# Patient Record
Sex: FEMALE | Race: OTHER | HISPANIC OR LATINO
[De-identification: names, ages, dates, MRNs, and addresses within clinical notes are randomized per-mention and may not be internally consistent; named-entity substitution may affect disease eponyms.]

---

## 2020-01-22 PROBLEM — Z00.00 ENCOUNTER FOR PREVENTIVE HEALTH EXAMINATION: Status: ACTIVE | Noted: 2020-01-22

## 2020-01-30 ENCOUNTER — APPOINTMENT (OUTPATIENT)
Dept: SURGERY | Facility: CLINIC | Age: 75
End: 2020-01-30
Payer: MEDICARE

## 2020-01-30 VITALS
HEIGHT: 62 IN | WEIGHT: 143 LBS | DIASTOLIC BLOOD PRESSURE: 80 MMHG | TEMPERATURE: 99.1 F | SYSTOLIC BLOOD PRESSURE: 127 MMHG | BODY MASS INDEX: 26.31 KG/M2 | HEART RATE: 64 BPM

## 2020-01-30 DIAGNOSIS — N63.10 UNSPECIFIED LUMP IN THE RIGHT BREAST, UNSPECIFIED QUADRANT: ICD-10-CM

## 2020-01-30 DIAGNOSIS — Z87.39 PERSONAL HISTORY OF OTHER DISEASES OF THE MUSCULOSKELETAL SYSTEM AND CONNECTIVE TISSUE: ICD-10-CM

## 2020-01-30 DIAGNOSIS — Z78.9 OTHER SPECIFIED HEALTH STATUS: ICD-10-CM

## 2020-01-30 DIAGNOSIS — Z82.49 FAMILY HISTORY OF ISCHEMIC HEART DISEASE AND OTHER DISEASES OF THE CIRCULATORY SYSTEM: ICD-10-CM

## 2020-01-30 DIAGNOSIS — I10 ESSENTIAL (PRIMARY) HYPERTENSION: ICD-10-CM

## 2020-01-30 PROCEDURE — 99203 OFFICE O/P NEW LOW 30 MIN: CPT

## 2020-01-30 NOTE — PLAN
[FreeTextEntry1] : Ms. JAIMES  is presenting  today for an evaluation .  she is doing well and offers no complaints.  Results of  her recent  imaging and physical examination findings were discussed in details.   She  was advised to have   US guided right breast biopsy and return after the tests. Importance of monthly self-breast examination was reinforced.  Patient's questions and concerns addressed to patient's satisfaction.\par

## 2020-01-30 NOTE — CONSULT LETTER
[Dear  ___] : Dear  [unfilled], [Consult Letter:] : I had the pleasure of evaluating your patient, [unfilled]. [Sincerely,] : Sincerely, [Consult Closing:] : Thank you very much for allowing me to participate in the care of this patient.  If you have any questions, please do not hesitate to contact me. [Please see my note below.] : Please see my note below. [FreeTextEntry3] : Redd Mason MD, FACS

## 2020-01-30 NOTE — HISTORY OF PRESENT ILLNESS
[de-identified] : Patient is a 74 year  year-old female  who was referred by Dr. Josh Aguilar with the chief complaint of having a Right  breast mass.  She  denies any trauma and She has no nipple discharge. She  denies any fever, night sweats or loss of appetite.    There is no family history of breast carcinoma.   Menarche at age 12 -1 para-0 and her last menstrual period was at age 44 before her hysterectomy for fibroids.  Patient is on no hormonal replacement therapy.   Patient  had a mammogram on 2019 ,   that was deemed a BIRADS 0 .  She had a sonogram of the breast on 2019 it was deemed a BIRADS 4 secondary to right breast mass.  she reports right breast mass axcision in her 20s. \par

## 2020-01-30 NOTE — PHYSICAL EXAM
[Alert] : alert [Oriented to Place] : oriented to place [Oriented to Person] : oriented to person [Oriented to Time] : oriented to time [de-identified] : She  is alert, well-groomed, and cheerful.\par   [Calm] : calm [de-identified] :   anicteric.  Nasal mucosa pink, septum midline. Oral mucosa pink.  Tongue midline, Pharynx without exudates.\par   [de-identified] : No chest deformity. Breast are symmetric, Normal contours. No nodules, masses, tenderness, or axillary or supraclavicular  adenopathy. No nipple discharge. no skin retraction \par   [de-identified] :  Neck supple. Trachea midline. Thyroid isthmus barely palpable, lobes not felt.\par

## 2020-03-12 ENCOUNTER — APPOINTMENT (OUTPATIENT)
Dept: SURGERY | Facility: CLINIC | Age: 75
End: 2020-03-12
Payer: MEDICARE

## 2020-03-12 VITALS
WEIGHT: 160 LBS | DIASTOLIC BLOOD PRESSURE: 78 MMHG | HEART RATE: 69 BPM | HEIGHT: 62 IN | BODY MASS INDEX: 29.44 KG/M2 | SYSTOLIC BLOOD PRESSURE: 138 MMHG

## 2020-03-12 PROCEDURE — 99213 OFFICE O/P EST LOW 20 MIN: CPT

## 2020-03-12 RX ORDER — METOPROLOL TARTRATE 75 MG/1
TABLET, FILM COATED ORAL
Refills: 0 | Status: ACTIVE | COMMUNITY

## 2020-03-12 RX ORDER — OLMESARTAN MEDOXOMIL 40 MG/1
TABLET, FILM COATED ORAL
Refills: 0 | Status: ACTIVE | COMMUNITY

## 2020-03-12 RX ORDER — AMLODIPINE BESYLATE 5 MG/1
TABLET ORAL
Refills: 0 | Status: ACTIVE | COMMUNITY

## 2020-03-12 RX ORDER — CLONIDINE HYDROCHLORIDE 0.3 MG/1
TABLET ORAL
Refills: 0 | Status: ACTIVE | COMMUNITY

## 2020-03-12 RX ORDER — SPIRONOLACTONE 50 MG/1
TABLET ORAL
Refills: 0 | Status: ACTIVE | COMMUNITY

## 2020-03-12 NOTE — DATA REVIEWED
[FreeTextEntry1] : 						\par Exam requested by:\par PADMINI IBRAHIM MD\par [address] [place]\par [facility] [address]\par SITE PERFORMED: [place]\par SITE PHONE: [phone]\par Patient: KRUNAL JAIMES\par YOB: 1945\par Phone: [phone]\par MRN: [id number] Acc: [id number]\par Date of Exam: 02-\par  \par Addendum 2 - 02-\par  \par ADDENDUM\par \par Results were discussed with Dr. Ibrahim at 2:54 PM.\par \par \par \par \par Thank you for the opportunity to participate in the care of this patient.  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 Receptor analysis addendum to follow.\par \par CONCORDANCE: Pathology results match the prebiopsy imaging appearance.\par \par RECOMMENDATION: \par \par Surgical consultation.\par \par Thank you for the opportunity to participate in the care of this patient.  \par  \par Bowen Johnson MD  - Electronically Signed: 02- 2:40 PM \par Physician to Physician Direct Line is: [phone]

## 2020-03-12 NOTE — PHYSICAL EXAM
[Alert] : alert [Oriented to Person] : oriented to person [Oriented to Place] : oriented to place [Oriented to Time] : oriented to time [Calm] : calm [de-identified] : She  is alert, well-groomed, and cheerful.\par   [de-identified] :   anicteric.  Nasal mucosa pink, septum midline. Oral mucosa pink.  Tongue midline, Pharynx without exudates.\par   [de-identified] :  Neck supple. Trachea midline. Thyroid isthmus barely palpable, lobes not felt.\par   [de-identified] : No chest deformity. Breast are symmetric, Normal contours. No nodules, masses, tenderness, or axillary or supraclavicular  adenopathy. No nipple discharge. no skin retraction \par

## 2020-03-12 NOTE — PLAN
[FreeTextEntry1] : Ms. JAIMES  is presenting  today for an evaluation .  she is doing well and offers no complaints.  Results of  her recent   Breast Biopsy and physical examination findings were discussed in details. No palpable masses.    She  was advised to have BL                 breast MRI   and return after the tests. Importance of monthly self-breast examination was reinforced.  Patient's questions and concerns addressed to patient's satisfaction.\par

## 2020-03-12 NOTE — HISTORY OF PRESENT ILLNESS
[de-identified] : This is  a 74 year   old patient presenting today for a breast exam and to discuss the results of her recent  breast  biopsy Patient had a right breast  US guided biopsy on 02/25/2020 . 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 Pathology results match the pre-biopsy imaging appearance. results are concordant to the imaging. Ms. JAIMES denies any trauma and she has no nipple discharge. Patient denies any fever, night sweats or loss of appetite.    There is no family history of breast carcinoma. [de-identified] :  PAtient had Echocardiogram on 02/05/2020 - normal.

## 2020-03-23 ENCOUNTER — APPOINTMENT (OUTPATIENT)
Dept: SURGERY | Facility: CLINIC | Age: 75
End: 2020-03-23

## 2020-04-20 ENCOUNTER — APPOINTMENT (OUTPATIENT)
Dept: SURGERY | Facility: CLINIC | Age: 75
End: 2020-04-20
Payer: MEDICARE

## 2020-04-20 VITALS — DIASTOLIC BLOOD PRESSURE: 83 MMHG | HEART RATE: 63 BPM | SYSTOLIC BLOOD PRESSURE: 137 MMHG | TEMPERATURE: 98.8 F

## 2020-04-20 PROCEDURE — 99213 OFFICE O/P EST LOW 20 MIN: CPT

## 2020-04-20 NOTE — HISTORY OF PRESENT ILLNESS
[de-identified] : This is  a 74 year   old patient presenting today for a breast exam and to discuss the results of her recent  BL breast MRI  Patient had a right breast  US guided biopsy on 02/25/2020 . 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   MRI was done on 03/19/2020  and it showed The biopsy-proven malignancy in the right breast 5 o'clock position measures 1.0 x 0.8 x 0.5 cm. it has also shown a 0.6 x 0.4 x 0.4 cm irregular enhancing lesion in the RIGHT breast 11 o'clock position and a 1.8 cm segment of clumped linear enhancement in the LEFT breast 2 o'clock position are suspicious for malignancy.   patient had BL breast Biopsies on 04/01/2020.  RIGHT 11 o'clock axis showed atypical ductal hyperplasia. the  LEFT 2:00 axis yielded mildly proliferative breast changes characterized by usual ductal hyperplasia, adenosis, columnar cell change, cystic apocrine metaplasia, scattered epithelial calcifications in the background of dense stromal fibrosis with focal PASH. Ms. JAIMES denies any trauma and she has no nipple discharge. Patient denies any fever, night sweats or loss of appetite.   Patient is on no hormonal replacement therapy.  receptors are not available.  [de-identified] :   Patient reports no interval changes to her overall health status or medical history

## 2020-04-20 NOTE — PHYSICAL EXAM
[Alert] : alert [Oriented to Person] : oriented to person [Oriented to Place] : oriented to place [Calm] : calm [Oriented to Time] : oriented to time [de-identified] : She  is alert, well-groomed, and cheerful.\par   [de-identified] :   anicteric.  Nasal mucosa pink, septum midline. Oral mucosa pink.  Tongue midline, Pharynx without exudates.\par   [de-identified] :  Neck supple. Trachea midline. Thyroid isthmus barely palpable, lobes not felt.\par   [de-identified] : No chest deformity. Breast are symmetric, Normal contours. No nodules, masses, tenderness, or axillary or supraclavicular  adenopathy. No nipple discharge. no skin retraction \par

## 2020-04-20 NOTE — DATA REVIEWED
[FreeTextEntry1] : 	\par Exam requested by:\par NIRANJAN PONCE MD\par [address] [facility]\par [facility] [address]\par SITE PERFORMED: FLUSHING\par SITE PHONE: [phone]\par Patient: KRUNAL JAIMES\par YOB: 1945\par Phone: [phone]\par MRN: [id number] Acc: [id number]\par Date of Exam: 03-\par  \par EXAM:  MRI BILATERAL BREASTS WITHOUT AND WITH CONTRAST\par \par HISTORY:  The patient is 74 years old and is seen for evaluation of extent of disease. The patient was recently diagnosed with invasive ductal carcinoma and DCIS in the 5 o'clock position of the right breast 4 cm from the nipple on ultrasound-guided core needle biopsy. There is no family history of breast cancer.\par \par TECHNIQUE: Breast MR protocol - Multiplanar, multisequence MR imaging of both breasts was performed on a 3T magnet: T2 weighted sequence with fat suppression in the axial plane, T1 weighted sequences with fat suppression before and after the administration of gadolinium contrast in the axial and sagittal planes.\par \par Contrast:  16 mL Dotarem from a 20 mL vial.\par \par The patient was scanned in the prone position utilizing a dedicated breast coil.  \par \par Post processing subtraction was performed. This study was analyzed on a [email or web address] Workstation with capabilities of multiplanar reformatting, maximum intensity projection, computer aided detection and time:signal intensity kinetic curve generation.  Results were compared with the PACS workstation. \par \par COMPARISON:  Correlation is made with mammogram dated 11/14/2019, ultrasound dated 12/27/2019, and images from ultrasound-guided core needle biopsy dated 2/25/2020.\par \par FINDINGS:  \par \par BACKGROUND BREAST APPEARANCE:  There is heterogeneously dense fibroglandular tissue with mild background parenchymal enhancement.\par \par Right breast: In the 5 o'clock position middle depth on series 604 image 145/225, there is a 1.0 x 0.8 x 0.5 cm enhancing mass containing susceptibility artifact from a biopsy clip, corresponding with the biopsy-proven site of malignancy. In the 11 o'clock position of the right breast middle depth on series 604 image 99/225 and series 617 image 77/94, there is a 0.6 x 0.4 x 0.4 cm irregular enhancing lesion which is suspicious for malignancy.\par \par Left breast: In the 2 o'clock position middle depth on series 604 image 116/225 and series 617 image 16/94, there is a 1.8 cm segment of clumped linear enhancement which is suspicious for malignancy. \par \par There is no significant axillary or internal mammary lymphadenopathy.\par \par IMPRESSION: \par \par 1. A 0.6 x 0.4 x 0.4 cm irregular enhancing lesion in the right breast 11 o'clock position and a 1.8 cm segment of clumped linear enhancement in the left breast 2 o'clock position are suspicious for malignancy. MR guided core needle biopsies of these 2 sites are recommended.\par \par 2. The biopsy-proven malignancy in the right breast 5 o'clock position measures 1.0 x 0.8 x 0.5 cm. Surgical oncological management is recommended.  \par \par FOLLOW-UP:  Biopsy should be considered.\par \par ASSESSMENT:  BI-RADS Category 4:  Suspicious. \par \par Thank you for the opportunity to participate in the care of this patient.  \par  \par GARRICK JOYA MD  - Electronically Signed: 03- 9:48 AM \par Physician to Physician Direct Line is: [phone]\par

## 2020-04-20 NOTE — PLAN
[FreeTextEntry1] : Ms. JAIMES  was told significance of findings, options, risks and benefits were explained.  Pathology results were discussed in details.  Informed consent for right modified mastectomy with sentinel node biopsy and potential risks, benefits and alternatives (surgical options were discussed including non-surgical options or the option of no surgery) to the planned surgery were discussed in depth.  All surgical options were discussed including non-surgical treatments.  She wishes to proceed with surgery.  We will plan for surgery on at the next available date, pending any required insurance pre-certification or pre-approval. She agrees to obtain any necessary pre-operative evaluations and testing prior to surgery.\par Patient advised to seek immediate medical attention with any acute change in symptoms or with the development of any new or worsening symptoms.  Patient's questions and concerns addressed to patient's satisfaction, and patient verbalized an understanding of the information discussed.\par \par

## 2020-05-18 ENCOUNTER — OUTPATIENT (OUTPATIENT)
Dept: OUTPATIENT SERVICES | Facility: HOSPITAL | Age: 75
LOS: 1 days | End: 2020-05-18

## 2020-05-18 ENCOUNTER — APPOINTMENT (OUTPATIENT)
Dept: DISASTER EMERGENCY | Facility: CLINIC | Age: 75
End: 2020-05-18

## 2020-05-18 VITALS
WEIGHT: 162.92 LBS | DIASTOLIC BLOOD PRESSURE: 84 MMHG | HEIGHT: 62 IN | SYSTOLIC BLOOD PRESSURE: 123 MMHG | OXYGEN SATURATION: 100 % | RESPIRATION RATE: 16 BRPM | TEMPERATURE: 98 F | HEART RATE: 91 BPM

## 2020-05-18 DIAGNOSIS — C50.911 MALIGNANT NEOPLASM OF UNSPECIFIED SITE OF RIGHT FEMALE BREAST: ICD-10-CM

## 2020-05-18 DIAGNOSIS — Z90.710 ACQUIRED ABSENCE OF BOTH CERVIX AND UTERUS: Chronic | ICD-10-CM

## 2020-05-18 DIAGNOSIS — Z98.890 OTHER SPECIFIED POSTPROCEDURAL STATES: Chronic | ICD-10-CM

## 2020-05-18 DIAGNOSIS — I10 ESSENTIAL (PRIMARY) HYPERTENSION: ICD-10-CM

## 2020-05-18 DIAGNOSIS — Z01.818 ENCOUNTER FOR OTHER PREPROCEDURAL EXAMINATION: ICD-10-CM

## 2020-05-18 NOTE — H&P PST ADULT - NEGATIVE GENERAL GENITOURINARY SYMPTOMS
no hematuria/normal urinary frequency/no incontinence/no urinary hesitancy/no renal colic/no dysuria

## 2020-05-18 NOTE — H&P PST ADULT - NSICDXPROBLEM_GEN_ALL_CORE_FT
PROBLEM DIAGNOSES  Problem: Hypertension  Assessment and Plan: Continue antihypertensive meds and take with sips of water on day of surgery.  Cleared by PCP. Follow-up with PCP postop for management.    Problem: Malignant neoplasm of unspecified site of right female breast  Assessment and Plan: Patient  is scheduled for right modified radical mastectomy with sentinel lymph node biopsy on 5/20/20.

## 2020-05-18 NOTE — H&P PST ADULT - MUSCULOSKELETAL
details… detailed exam decreased ROM due to pain/diminished strength/no joint swelling/no joint erythema/no joint warmth

## 2020-05-18 NOTE — H&P PST ADULT - NEGATIVE FEMALE-SPECIFIC SYMPTOMS
No pertinent past medical history no abnormal vaginal bleeding/no pelvic pain/no spotting <<----- Click to add NO pertinent Past Medical History

## 2020-05-18 NOTE — H&P PST ADULT - COMMENTS
VS taken at preop PCU on day of surgery 5/20/2020, after patient arrived for surgery, due to COVID restrictions

## 2020-05-18 NOTE — H&P PST ADULT - GASTROINTESTINAL DETAILS
normal/no bruit/bowel sounds normal/soft/nontender/no distention/no masses palpable/no rebound tenderness

## 2020-05-18 NOTE — H&P PST ADULT - NS PRO PASSIVE SMOKE EXP
Clorece is here for lab draw and results. Patient denies any fevers, chills, or signs of infection. Port accessed, per [facility] procedure, with 20G Briseno. Brisk blood return noted. 10ml 0.9 NS flush before and after blood draw, followed by 500 units of heparin. Port deaccessed, pressure held, gauze dressing applied to site. Patient tolerated procedure well. Patient wishes to stay for results.      CBC results reviewed with patient at chairside. Clorece was instructed to monitor for signs of infection or illness. Clorece verbalized understanding of information given.    No

## 2020-05-18 NOTE — H&P PST ADULT - NSICDXPASTMEDICALHX_GEN_ALL_CORE_FT
PAST MEDICAL HISTORY:  Cyst, breast, right hx of    Fibroids hx of    Malignant neoplasm of unspecified site of right female breast     Osteoarthritis bilateral knees

## 2020-05-18 NOTE — H&P PST ADULT - NEGATIVE BREAST SYMPTOMS
no breast tenderness R/no breast lump L/no nipple discharge L/no breast tenderness L/no nipple discharge R

## 2020-05-18 NOTE — H&P PST ADULT - ADDITIONAL PE
Addendum by BAMBI Mueller NP 05/20/20 - PE done on day of surgery at preop PCU when patient arrived for surgery

## 2020-05-18 NOTE — H&P PST ADULT - BREASTS COMMENTS
right breast mass 11 o'clock and 5 o'clock right breast mass 11 o'clock and 5 o'clock as per mammogram , no palpable

## 2020-05-18 NOTE — H&P PST ADULT - NEGATIVE CARDIOVASCULAR SYMPTOMS
no orthopnea/no paroxysmal nocturnal dyspnea/no claudication/no dyspnea on exertion/no peripheral edema/no chest pain/no palpitations

## 2020-05-18 NOTE — H&P PST ADULT - HISTORY OF PRESENT ILLNESS
75 years old female interviewed over phone with  for evaluation before surgery , pt was diagnosed with Malignant neoplasm of unspecified site of right female breast and is scheduled for right modified radical mastectomy with sentinel lymph node biopsy on 5/20/20.

## 2020-05-18 NOTE — H&P PST ADULT - NEGATIVE NEUROLOGICAL SYMPTOMS
no paresthesias/no difficulty walking/no confusion/no weakness/no generalized seizures/no loss of sensation/no loss of consciousness/no syncope/no headache/no facial palsy/no transient paralysis/no focal seizures/no tremors/no vertigo/no hemiparesis

## 2020-05-18 NOTE — H&P PST ADULT - ASSESSMENT
75 years old female  diagnosed with Malignant neoplasm of unspecified site of right female breast. Instructions given to patient in [place]ik, pt verbalized understanding, pt to stop ASA 81 mg , last dose 5/17/2020, dr. Mason aware, COVID 19 rapid nasopharyngeal test pending 75 years old female  diagnosed with Malignant neoplasm of unspecified site of right female breast. Instructions given to patient in Turkish, pt verbalized understanding, pt to stop ASA 81 mg , last dose 5/17/2020, dr. Mason aware, COVID 19 rapid nasopharyngeal test NEGATIVE, OR booking notified

## 2020-05-19 ENCOUNTER — TRANSCRIPTION ENCOUNTER (OUTPATIENT)
Age: 75
End: 2020-05-19

## 2020-05-19 LAB — SARS-COV-2 N GENE NPH QL NAA+PROBE: NOT DETECTED

## 2020-05-20 ENCOUNTER — APPOINTMENT (OUTPATIENT)
Dept: SURGERY | Facility: HOSPITAL | Age: 75
End: 2020-05-20

## 2020-05-20 ENCOUNTER — INPATIENT (INPATIENT)
Facility: HOSPITAL | Age: 75
LOS: 0 days | Discharge: HOME | DRG: 581 | End: 2020-05-21
Attending: SPECIALIST | Admitting: SPECIALIST
Payer: COMMERCIAL

## 2020-05-20 ENCOUNTER — RESULT REVIEW (OUTPATIENT)
Age: 75
End: 2020-05-20

## 2020-05-20 VITALS
OXYGEN SATURATION: 100 % | HEIGHT: 62 IN | TEMPERATURE: 98 F | HEART RATE: 91 BPM | RESPIRATION RATE: 16 BRPM | WEIGHT: 162.92 LBS | DIASTOLIC BLOOD PRESSURE: 84 MMHG | SYSTOLIC BLOOD PRESSURE: 123 MMHG

## 2020-05-20 DIAGNOSIS — D21.9 BENIGN NEOPLASM OF CONNECTIVE AND OTHER SOFT TISSUE, UNSPECIFIED: ICD-10-CM

## 2020-05-20 DIAGNOSIS — C50.911 MALIGNANT NEOPLASM OF UNSPECIFIED SITE OF RIGHT FEMALE BREAST: ICD-10-CM

## 2020-05-20 DIAGNOSIS — Z98.890 OTHER SPECIFIED POSTPROCEDURAL STATES: Chronic | ICD-10-CM

## 2020-05-20 DIAGNOSIS — Z90.710 ACQUIRED ABSENCE OF BOTH CERVIX AND UTERUS: Chronic | ICD-10-CM

## 2020-05-20 LAB
ABO RH CONFIRMATION: SIGNIFICANT CHANGE UP
BLD GP AB SCN SERPL QL: SIGNIFICANT CHANGE UP
HCT VFR BLD CALC: 39.9 % — SIGNIFICANT CHANGE UP (ref 34.5–45)
HGB BLD-MCNC: 13.2 G/DL — SIGNIFICANT CHANGE UP (ref 11.5–15.5)
MCHC RBC-ENTMCNC: 30.1 PG — SIGNIFICANT CHANGE UP (ref 27–34)
MCHC RBC-ENTMCNC: 33.1 GM/DL — SIGNIFICANT CHANGE UP (ref 32–36)
MCV RBC AUTO: 90.9 FL — SIGNIFICANT CHANGE UP (ref 80–100)
NRBC # BLD: 0 /100 WBCS — SIGNIFICANT CHANGE UP (ref 0–0)
PLATELET # BLD AUTO: 236 K/UL — SIGNIFICANT CHANGE UP (ref 150–400)
RBC # BLD: 4.39 M/UL — SIGNIFICANT CHANGE UP (ref 3.8–5.2)
RBC # FLD: 14.1 % — SIGNIFICANT CHANGE UP (ref 10.3–14.5)
WBC # BLD: 7.58 K/UL — SIGNIFICANT CHANGE UP (ref 3.8–10.5)
WBC # FLD AUTO: 7.58 K/UL — SIGNIFICANT CHANGE UP (ref 3.8–10.5)

## 2020-05-20 PROCEDURE — 88307 TISSUE EXAM BY PATHOLOGIST: CPT | Mod: 26

## 2020-05-20 PROCEDURE — 78195 LYMPH SYSTEM IMAGING: CPT | Mod: 26

## 2020-05-20 PROCEDURE — 88334 PATH CONSLTJ SURG CYTO XM EA: CPT | Mod: 26,59

## 2020-05-20 PROCEDURE — 19307 MAST MOD RAD: CPT | Mod: AS,RT

## 2020-05-20 PROCEDURE — 88331 PATH CONSLTJ SURG 1 BLK 1SPC: CPT | Mod: 26

## 2020-05-20 PROCEDURE — 19307 MAST MOD RAD: CPT | Mod: RT

## 2020-05-20 PROCEDURE — 88309 TISSUE EXAM BY PATHOLOGIST: CPT | Mod: 26

## 2020-05-20 PROCEDURE — 88341 IMHCHEM/IMCYTCHM EA ADD ANTB: CPT | Mod: 26

## 2020-05-20 PROCEDURE — 88342 IMHCHEM/IMCYTCHM 1ST ANTB: CPT | Mod: 26

## 2020-05-20 RX ORDER — METOPROLOL TARTRATE 50 MG
200 TABLET ORAL DAILY
Refills: 0 | Status: DISCONTINUED | OUTPATIENT
Start: 2020-05-20 | End: 2020-05-21

## 2020-05-20 RX ORDER — ASPIRIN/CALCIUM CARB/MAGNESIUM 324 MG
1 TABLET ORAL
Qty: 0 | Refills: 0 | DISCHARGE

## 2020-05-20 RX ORDER — ONDANSETRON 8 MG/1
4 TABLET, FILM COATED ORAL ONCE
Refills: 0 | Status: DISCONTINUED | OUTPATIENT
Start: 2020-05-20 | End: 2020-05-20

## 2020-05-20 RX ORDER — HYDROMORPHONE HYDROCHLORIDE 2 MG/ML
1 INJECTION INTRAMUSCULAR; INTRAVENOUS; SUBCUTANEOUS
Refills: 0 | Status: DISCONTINUED | OUTPATIENT
Start: 2020-05-20 | End: 2020-05-20

## 2020-05-20 RX ORDER — OXYCODONE AND ACETAMINOPHEN 5; 325 MG/1; MG/1
1 TABLET ORAL EVERY 4 HOURS
Refills: 0 | Status: DISCONTINUED | OUTPATIENT
Start: 2020-05-20 | End: 2020-05-21

## 2020-05-20 RX ORDER — SODIUM CHLORIDE 9 MG/ML
3 INJECTION INTRAMUSCULAR; INTRAVENOUS; SUBCUTANEOUS EVERY 8 HOURS
Refills: 0 | Status: DISCONTINUED | OUTPATIENT
Start: 2020-05-20 | End: 2020-05-20

## 2020-05-20 RX ORDER — LOSARTAN POTASSIUM 100 MG/1
100 TABLET, FILM COATED ORAL DAILY
Refills: 0 | Status: DISCONTINUED | OUTPATIENT
Start: 2020-05-20 | End: 2020-05-21

## 2020-05-20 RX ORDER — SODIUM CHLORIDE 9 MG/ML
1000 INJECTION, SOLUTION INTRAVENOUS
Refills: 0 | Status: DISCONTINUED | OUTPATIENT
Start: 2020-05-20 | End: 2020-05-20

## 2020-05-20 RX ORDER — MORPHINE SULFATE 50 MG/1
4 CAPSULE, EXTENDED RELEASE ORAL EVERY 4 HOURS
Refills: 0 | Status: DISCONTINUED | OUTPATIENT
Start: 2020-05-20 | End: 2020-05-21

## 2020-05-20 RX ORDER — AMLODIPINE BESYLATE 2.5 MG/1
5 TABLET ORAL AT BEDTIME
Refills: 0 | Status: DISCONTINUED | OUTPATIENT
Start: 2020-05-20 | End: 2020-05-21

## 2020-05-20 RX ORDER — AMLODIPINE BESYLATE 2.5 MG/1
1 TABLET ORAL
Qty: 0 | Refills: 0 | DISCHARGE

## 2020-05-20 RX ORDER — HYDROMORPHONE HYDROCHLORIDE 2 MG/ML
0.5 INJECTION INTRAMUSCULAR; INTRAVENOUS; SUBCUTANEOUS
Refills: 0 | Status: DISCONTINUED | OUTPATIENT
Start: 2020-05-20 | End: 2020-05-20

## 2020-05-20 RX ORDER — OLMESARTAN MEDOXOMIL 5 MG/1
1 TABLET, FILM COATED ORAL
Qty: 0 | Refills: 0 | DISCHARGE

## 2020-05-20 RX ORDER — SODIUM CHLORIDE 9 MG/ML
1000 INJECTION, SOLUTION INTRAVENOUS
Refills: 0 | Status: DISCONTINUED | OUTPATIENT
Start: 2020-05-20 | End: 2020-05-21

## 2020-05-20 RX ORDER — METOPROLOL TARTRATE 50 MG
1 TABLET ORAL
Qty: 0 | Refills: 0 | DISCHARGE

## 2020-05-20 RX ADMIN — Medication 0.1 MILLIGRAM(S): at 18:08

## 2020-05-20 NOTE — PROGRESS NOTE ADULT - SUBJECTIVE AND OBJECTIVE BOX
POST-OPERATIVE NOTE    Subjective: Patient is s/p right modified radical mastectomy, POD#0. Patient sitting in chair resting comfortably, in no acute distress. Denies incisional pain, nausea, vomiting, chest pain, sob, fevers or chills. Ambulating independently. Voiding spontaneously.    Vital Signs Last 24 Hrs  T(C): 36.6 (20 May 2020 13:00), Max: 36.7 (20 May 2020 07:18)  T(F): 97.8 (20 May 2020 13:00), Max: 98 (20 May 2020 07:18)  HR: 56 (20 May 2020 13:00) (56 - 91)  BP: 97/58 (20 May 2020 13:00) (96/58 - 123/84)  BP(mean): 72 (20 May 2020 12:00) (65 - 72)  RR: 16 (20 May 2020 13:00) (12 - 18)  SpO2: 100% (20 May 2020 13:00) (97% - 100%)    I&O's Detail  20 May 2020 07:01  -  20 May 2020 18:26  --------------------------------------------------------  IN:    Lactated Ringers IV Bolus: 850 mL  Total IN: 850 mL    OUT:    Bulb: 30 mL    Bulb: 15 mL  Total OUT: 45 mL  Total NET: 805 mL    Physical Exam:  General: NAD, resting comfortably in chair  Respiratory: Nonlabored breathing, no respiratory distress  Chest: dressing in place, NEREYDA drains in place with serosanguinous output, 30cc and 15cc  Extremities: [facility]    LABS:                     13.2   7.58  )-----------( 236      ( 20 May 2020 07:50 )             39.9

## 2020-05-20 NOTE — CONSULT NOTE ADULT - SUBJECTIVE AND OBJECTIVE BOX
Patient is a 75y old  Female who presents with a chief complaint of " surgery to remove right breast" (18 May 2020 14:30)    History of Present Illness:  History of Present Illness		  75 years old female interviewed over phone with  for evaluation before surgery , pt was diagnosed with Malignant neoplasm of unspecified site of right female breast and is scheduled for right modified radical mastectomy with sentinel lymph node biopsy on 5/20/20. Awake, alert, comfortable in bed in NAD  INTERVAL HPI/OVERNIGHT EVENTS:  T(C): 36.6 (05-20-20 @ 12:00), Max: 36.7 (05-20-20 @ 07:18)  HR: 61 (05-20-20 @ 12:00) (58 - 91)  BP: 108/60 (05-20-20 @ 12:00) (96/58 - 123/84)  RR: 18 (05-20-20 @ 12:00) (12 - 18)  SpO2: 97% (05-20-20 @ 12:00) (97% - 100%)  Wt(kg): --  I&O's Summary    20 May 2020 07:01  -  20 May 2020 13:10  --------------------------------------------------------  IN: 850 mL / OUT: 45 mL / NET: 805 mL        PAST MEDICAL & SURGICAL HISTORY:  Osteoarthritis: bilateral knees  Fibroids: hx of  Malignant neoplasm of unspecified site of right female breast  Cyst, breast, right: hx of  S/P hysterectomy: age 41  S/P lumpectomy, right breast: 20 years ago      SOCIAL HISTORY  Alcohol:  Tobacco:  Illicit substance use:      FAMILY HISTORY:      LABS:                        13.2   7.58  )-----------( 236      ( 20 May 2020 07:50 )             39.9               CAPILLARY BLOOD GLUCOSE                MEDICATIONS  (STANDING):  amLODIPine   Tablet 5 milliGRAM(s) Oral at bedtime  cloNIDine 0.1 milliGRAM(s) Oral two times a day  lactated ringers. 1000 milliLiter(s) (75 mL/Hr) IV Continuous <Continuous>  lactated ringers. 1000 milliLiter(s) (75 mL/Hr) IV Continuous <Continuous>  losartan 100 milliGRAM(s) Oral daily  metoprolol succinate  milliGRAM(s) Oral daily    MEDICATIONS  (PRN):  HYDROmorphone  Injectable 0.5 milliGRAM(s) IV Push every 10 minutes PRN Moderate Pain (4 - 6)  HYDROmorphone  Injectable 1 milliGRAM(s) IV Push every 10 minutes PRN Severe Pain (7 - 10)  morphine  - Injectable 4 milliGRAM(s) IV Push every 4 hours PRN Severe Pain (7 - 10)  ondansetron Injectable 4 milliGRAM(s) IV Push once PRN Nausea and/or Vomiting  oxycodone    5 mG/acetaminophen 325 mG 1 Tablet(s) Oral every 4 hours PRN Moderate Pain (4 - 6)      REVIEW OF SYSTEMS:  CONSTITUTIONAL: No fever, weight loss, or fatigue  EYES: No eye pain, visual disturbances, or discharge  ENMT:  No difficulty hearing, tinnitus, vertigo; No sinus or throat pain  NECK: No pain or stiffness  RESPIRATORY: No cough, wheezing, chills or hemoptysis; No shortness of breath  CARDIOVASCULAR: No chest pain, palpitations, dizziness, or leg swelling  GASTROINTESTINAL: No abdominal or epigastric pain. No nausea, vomiting, or hematemesis; No diarrhea or constipation. No melena or hematochezia.  GENITOURINARY: No dysuria, frequency, hematuria, or incontinence  NEUROLOGICAL: No headaches, memory loss, loss of strength, numbness, or tremors  SKIN: No itching, burning, rashes, or lesions   LYMPH NODES: No enlarged glands  ENDOCRINE: No heat or cold intolerance; No hair loss  MUSCULOSKELETAL: No joint pain or swelling; No muscle, back, or extremity pain  PSYCHIATRIC: No depression, anxiety, mood swings, or difficulty sleeping  HEME/LYMPH: No easy bruising, or bleeding gums  ALLERY AND IMMUNOLOGIC: No hives or eczema    PHYSICAL EXAM:  GENERAL: NAD, well-groomed, well-developed  HEAD:  Atraumatic, Normocephalic  EYES: EOMI, PERRLA, conjunctiva and sclera clear  ENMT: No tonsillar erythema, exudates, or enlargement; Moist mucous membranes, Good dentition, No lesions  NECK: Supple, No JVD, Normal thyroid  NERVOUS SYSTEM:  Alert & Oriented X3, Good concentration; Motor Strength 5/5 B/L upper and lower extremities; DTRs 2+ intact and symmetric  CHEST/LUNG: Clear to percussion bilaterally; No rales, rhonchi, wheezing, or rubs  HEART: Regular rate and rhythm; No murmurs, rubs, or gallops  ABDOMEN: Soft, Nontender, Nondistended; Bowel sounds present  EXTREMITIES:  2+ Peripheral Pulses, No clubbing, cyanosis, or edema  LYMPH: No lymphadenopathy noted  SKIN: No rashes or lesions    RADIOLOGY & ADDITIONAL TESTS:    Imaging Personally Reviewed:  [ x] YES  [ ] NO    Consultant(s) Notes Reviewed:  [x ] YES  [ ] NO        Care Discussed with Consultants/Other Providers [ x] YES  [ ] NO

## 2020-05-20 NOTE — PROGRESS NOTE ADULT - ASSESSMENT
75y Female s/p right modified radical mastectomy POD#0    - Pain control as needed  - DVT ppx/OOB and ambulating as tolerated  - F/u AM labs

## 2020-05-21 ENCOUNTER — TRANSCRIPTION ENCOUNTER (OUTPATIENT)
Age: 75
End: 2020-05-21

## 2020-05-21 VITALS
HEART RATE: 87 BPM | RESPIRATION RATE: 16 BRPM | TEMPERATURE: 98 F | OXYGEN SATURATION: 97 % | DIASTOLIC BLOOD PRESSURE: 65 MMHG | SYSTOLIC BLOOD PRESSURE: 131 MMHG

## 2020-05-21 LAB
ANION GAP SERPL CALC-SCNC: 10 MMOL/L — SIGNIFICANT CHANGE UP (ref 5–17)
BASOPHILS # BLD AUTO: 0.01 K/UL — SIGNIFICANT CHANGE UP (ref 0–0.2)
BASOPHILS NFR BLD AUTO: 0.1 % — SIGNIFICANT CHANGE UP (ref 0–2)
BUN SERPL-MCNC: 26 MG/DL — HIGH (ref 7–18)
CALCIUM SERPL-MCNC: 9.4 MG/DL — SIGNIFICANT CHANGE UP (ref 8.4–10.5)
CHLORIDE SERPL-SCNC: 100 MMOL/L — SIGNIFICANT CHANGE UP (ref 96–108)
CO2 SERPL-SCNC: 26 MMOL/L — SIGNIFICANT CHANGE UP (ref 22–31)
CREAT SERPL-MCNC: 1.19 MG/DL — SIGNIFICANT CHANGE UP (ref 0.5–1.3)
EOSINOPHIL # BLD AUTO: 0.02 K/UL — SIGNIFICANT CHANGE UP (ref 0–0.5)
EOSINOPHIL NFR BLD AUTO: 0.2 % — SIGNIFICANT CHANGE UP (ref 0–6)
GLUCOSE SERPL-MCNC: 126 MG/DL — HIGH (ref 70–99)
HCT VFR BLD CALC: 34 % — LOW (ref 34.5–45)
HGB BLD-MCNC: 11.3 G/DL — LOW (ref 11.5–15.5)
IMM GRANULOCYTES NFR BLD AUTO: 0.4 % — SIGNIFICANT CHANGE UP (ref 0–1.5)
LYMPHOCYTES # BLD AUTO: 16.4 % — SIGNIFICANT CHANGE UP (ref 13–44)
LYMPHOCYTES # BLD AUTO: 2.12 K/UL — SIGNIFICANT CHANGE UP (ref 1–3.3)
MCHC RBC-ENTMCNC: 30 PG — SIGNIFICANT CHANGE UP (ref 27–34)
MCHC RBC-ENTMCNC: 33.2 GM/DL — SIGNIFICANT CHANGE UP (ref 32–36)
MCV RBC AUTO: 90.2 FL — SIGNIFICANT CHANGE UP (ref 80–100)
MONOCYTES # BLD AUTO: 0.69 K/UL — SIGNIFICANT CHANGE UP (ref 0–0.9)
MONOCYTES NFR BLD AUTO: 5.3 % — SIGNIFICANT CHANGE UP (ref 2–14)
NEUTROPHILS # BLD AUTO: 10.01 K/UL — HIGH (ref 1.8–7.4)
NEUTROPHILS NFR BLD AUTO: 77.6 % — HIGH (ref 43–77)
NRBC # BLD: 0 /100 WBCS — SIGNIFICANT CHANGE UP (ref 0–0)
PLATELET # BLD AUTO: 250 K/UL — SIGNIFICANT CHANGE UP (ref 150–400)
POTASSIUM SERPL-MCNC: 4.4 MMOL/L — SIGNIFICANT CHANGE UP (ref 3.5–5.3)
POTASSIUM SERPL-SCNC: 4.4 MMOL/L — SIGNIFICANT CHANGE UP (ref 3.5–5.3)
RBC # BLD: 3.77 M/UL — LOW (ref 3.8–5.2)
RBC # FLD: 14.2 % — SIGNIFICANT CHANGE UP (ref 10.3–14.5)
SODIUM SERPL-SCNC: 136 MMOL/L — SIGNIFICANT CHANGE UP (ref 135–145)
WBC # BLD: 12.9 K/UL — HIGH (ref 3.8–10.5)
WBC # FLD AUTO: 12.9 K/UL — HIGH (ref 3.8–10.5)

## 2020-05-21 PROCEDURE — 88341 IMHCHEM/IMCYTCHM EA ADD ANTB: CPT

## 2020-05-21 PROCEDURE — 88309 TISSUE EXAM BY PATHOLOGIST: CPT

## 2020-05-21 PROCEDURE — 36415 COLL VENOUS BLD VENIPUNCTURE: CPT

## 2020-05-21 PROCEDURE — 80048 BASIC METABOLIC PNL TOTAL CA: CPT

## 2020-05-21 PROCEDURE — 88342 IMHCHEM/IMCYTCHM 1ST ANTB: CPT

## 2020-05-21 PROCEDURE — 88307 TISSUE EXAM BY PATHOLOGIST: CPT

## 2020-05-21 PROCEDURE — 86850 RBC ANTIBODY SCREEN: CPT

## 2020-05-21 PROCEDURE — 86900 BLOOD TYPING SEROLOGIC ABO: CPT

## 2020-05-21 PROCEDURE — 85027 COMPLETE CBC AUTOMATED: CPT

## 2020-05-21 PROCEDURE — 88333 PATH CONSLTJ SURG CYTO XM 1: CPT

## 2020-05-21 PROCEDURE — 86901 BLOOD TYPING SEROLOGIC RH(D): CPT

## 2020-05-21 PROCEDURE — 78195 LYMPH SYSTEM IMAGING: CPT

## 2020-05-21 PROCEDURE — 88331 PATH CONSLTJ SURG 1 BLK 1SPC: CPT

## 2020-05-21 PROCEDURE — A9541: CPT

## 2020-05-21 RX ORDER — ACETAMINOPHEN WITH CODEINE 300MG-30MG
1 TABLET ORAL
Qty: 8 | Refills: 0
Start: 2020-05-21

## 2020-05-21 RX ORDER — IBUPROFEN 200 MG
1 TABLET ORAL
Qty: 0 | Refills: 0 | DISCHARGE

## 2020-05-21 RX ADMIN — LOSARTAN POTASSIUM 100 MILLIGRAM(S): 100 TABLET, FILM COATED ORAL at 08:57

## 2020-05-21 RX ADMIN — Medication 200 MILLIGRAM(S): at 08:58

## 2020-05-21 NOTE — PROGRESS NOTE ADULT - SUBJECTIVE AND OBJECTIVE BOX
pt seen in icu [  ], reg med floor [ x  ], bed [  ], chair at bedside [ x  ]  Awake, alert, comfortable in bed in NAD  REVIEW OF SYSTEMS:    CONSTITUTIONAL: No weakness, fevers or chills  EYES/ENT: No visual changes;  No vertigo or throat pain   NECK: No pain or stiffness  RESPIRATORY: No cough, wheezing, hemoptysis; No shortness of breath  CARDIOVASCULAR: No chest pain or palpitations  GASTROINTESTINAL: No abdominal or epigastric pain. No nausea, vomiting, or hematemesis; No diarrhea or constipation. No melena or hematochezia.  GENITOURINARY: No dysuria, frequency or hematuria  NEUROLOGICAL: No numbness or weakness  SKIN: No itching, burning, rashes, or lesions   All other review of systems is negative unless indicated above.    Physical Exam    General: WN/WD NAD  Neurology: A&Ox3, nonfocal, NAVA x 4  Respiratory: CTA B/L  CV: RRR, S1S2, no murmurs, rubs or gallops  Abdominal: Soft, NT, ND +BS, Last BM  Extremities: No edema, + peripheral pulses      Allergies  No Known Allergies      Health Issues  Malignant neoplasm of right female breast  Osteoarthritis  Fibroids  Malignant neoplasm of unspecified site of right female breast  Cyst, breast, right  S/P hysterectomy  S/P lumpectomy, right breast      Vitals  T(F): 97.6 (05-21-20 @ 05:15), Max: 97.9 (05-20-20 @ 19:44)  HR: 60 (05-21-20 @ 06:44) (55 - 88)  BP: 113/58 (05-21-20 @ 06:44) (97/58 - 122/62)  RR: 16 (05-21-20 @ 05:15) (15 - 18)  SpO2: 100% (05-21-20 @ 05:15) (97% - 100%)  Wt(kg): --  CAPILLARY BLOOD GLUCOSE          Labs                          11.3   12.90 )-----------( 250      ( 21 May 2020 10:42 )             34.0       05-21    136  |  100  |  26<H>  ----------------------------<  126<H>  4.4   |  26  |  1.19    Ca    9.4      21 May 2020 10:42              Radiology Results      Meds    MEDICATIONS  (STANDING):  amLODIPine   Tablet 5 milliGRAM(s) Oral at bedtime  cloNIDine 0.1 milliGRAM(s) Oral two times a day  lactated ringers. 1000 milliLiter(s) (75 mL/Hr) IV Continuous <Continuous>  losartan 100 milliGRAM(s) Oral daily  metoprolol succinate  milliGRAM(s) Oral daily      MEDICATIONS  (PRN):  morphine  - Injectable 4 milliGRAM(s) IV Push every 4 hours PRN Severe Pain (7 - 10)  oxycodone    5 mG/acetaminophen 325 mG 1 Tablet(s) Oral every 4 hours PRN Moderate Pain (4 - 6)

## 2020-05-21 NOTE — PROGRESS NOTE ADULT - ASSESSMENT
75y old Female s/p right MRM POD#1 secondary to Malignant neoplasm of right female breast    - continue local wound care  - f/u labs  - DVT prophylaxis, Incentive Spirometer, OOB, Ambulating, pain control  - discharge planning to home with NEREYDA's if labs stable

## 2020-05-21 NOTE — DISCHARGE NOTE PROVIDER - CARE PROVIDER_API CALL
Redd Mason  SURGERY  [address] [place] [address]  Phone: [phone]  Fax: [phone]  Follow Up Time: 1 week

## 2020-05-21 NOTE — DISCHARGE NOTE PROVIDER - NSDCMRMEDTOKEN_GEN_ALL_CORE_FT
amLODIPine 5 mg oral tablet: 1 tab(s) orally once a day (at bedtime)  aspirin 81 mg oral tablet: 1 tab(s) orally once a day  cloNIDine 0.1 mg oral tablet: 1 tab(s) orally 2 times a day  metoprolol succinate 200 mg oral capsule, extended release: 1 cap(s) orally once a day  olmesartan 40 mg oral tablet: 1 tab(s) orally once a day  Tylenol with Codeine #3 oral tablet: 1 tab(s) orally every 6 hours, As Needed -for severe pain MDD:4

## 2020-05-21 NOTE — PROGRESS NOTE ADULT - PROBLEM SELECTOR PLAN 1
S/p right mastectomy and LN biopsy  incentive spirometry  pain control  wound care  surgical follow up

## 2020-05-21 NOTE — DISCHARGE NOTE PROVIDER - NSDCACTIVITY_GEN_ALL_CORE
Walking - Outdoors allowed/Stairs allowed/No heavy lifting/straining/Bathing allowed/Walking - Indoors allowed

## 2020-05-21 NOTE — DISCHARGE NOTE NURSING/CASE MANAGEMENT/SOCIAL WORK - PATIENT PORTAL LINK FT
You can access the FollowMyHealth Patient Portal offered by [facility] by registering at the following website: [email or web address]. By joining [employer] [facility]’s FollowMyHealth portal, you will also be able to view your health information using other applications (apps) compatible with our system.

## 2020-05-21 NOTE — DISCHARGE NOTE PROVIDER - HOSPITAL COURSE
75 years old female presented to [facility] prior to scheduled, elective right modified radical mastectomy on 5/20. Admitted postoperatively for monitoring. Cleared for discharge on POD#1 when her pain was well controlled, labs and vitals stable, and she was toelrating a regular diet.

## 2020-05-21 NOTE — PROGRESS NOTE ADULT - SUBJECTIVE AND OBJECTIVE BOX
S/p right MRM POD#1  Patient seen and examined at bedside with no complaints.   Tolerating diet  Pain well controlled    Vital Signs Last 24 Hrs  T(F): 97.6 (05-21-20 @ 05:15), Max: 98 (05-20-20 @ 11:00)  HR: 60 (05-21-20 @ 06:44)  BP: 113/58 (05-21-20 @ 06:44)  RR: 16 (05-21-20 @ 05:15)  SpO2: 100% (05-21-20 @ 05:15)    GENERAL: Alert, NAD  CHEST/LUNG: respirations nonlabored  BREAST: right breast dressing c/d/i. NEREYDA x 2 in place draining serosanguineous fluid: 175ml and 150ml since OR    I&O's Detail    20 May 2020 07:01  -  21 May 2020 07:00  --------------------------------------------------------  IN:    Lactated Ringers IV Bolus: 850 mL  Total IN: 850 mL    OUT:    Bulb: 175 mL    Bulb: 150 mL  Total OUT: 325 mL    Total NET: 525 mL    LABS:                        13.2   7.58  )-----------( 236      ( 20 May 2020 07:50 )             39.9

## 2020-05-21 NOTE — DISCHARGE NOTE PROVIDER - NSDCCPCAREPLAN_GEN_ALL_CORE_FT
PRINCIPAL DISCHARGE DIAGNOSIS  Diagnosis: Malignant neoplasm of unspecified site of right female breast  Assessment and Plan of Treatment: Drink plenty of fluids. Rest as needed. Do not lift anything heavier than 10 pounds. You may take motrin or advil for mild pain as needed, in addition to prescribed narcotic medication. Call for any fever over 101, nausea, vomiting, severe pain, no passing of gas or bowel movement. Please keep dressing in place. Empty drains as directed and record output. Follow up with Dr. Mason in 1 week.

## 2020-05-27 PROBLEM — D21.9 BENIGN NEOPLASM OF CONNECTIVE AND OTHER SOFT TISSUE, UNSPECIFIED: Chronic | Status: ACTIVE | Noted: 2020-05-18

## 2020-05-27 PROBLEM — C50.911 MALIGNANT NEOPLASM OF UNSPECIFIED SITE OF RIGHT FEMALE BREAST: Chronic | Status: ACTIVE | Noted: 2020-05-18

## 2020-05-27 PROBLEM — N60.01 SOLITARY CYST OF RIGHT BREAST: Chronic | Status: ACTIVE | Noted: 2020-05-18

## 2020-05-27 PROBLEM — M19.90 UNSPECIFIED OSTEOARTHRITIS, UNSPECIFIED SITE: Chronic | Status: ACTIVE | Noted: 2020-05-18

## 2020-05-29 LAB — SURGICAL PATHOLOGY STUDY: SIGNIFICANT CHANGE UP

## 2020-06-04 ENCOUNTER — APPOINTMENT (OUTPATIENT)
Dept: SURGERY | Facility: CLINIC | Age: 75
End: 2020-06-04
Payer: MEDICARE

## 2020-06-04 PROCEDURE — 99024 POSTOP FOLLOW-UP VISIT: CPT

## 2020-06-04 NOTE — ASSESSMENT
[FreeTextEntry1] : Ms. JAIMES is doing well, with excellent post-operative recovery. The surgical incision is healing well and as expected. There is no evidence of infection or complication, and patient is progressing as expected. NEREYDA drains were removed. no active bleeding.  Post-operative wound care, activity, restrictions and precautions reinforced.  Pathology results were discussed in details patient will see  Dr Ferny Bauer (oncology).    Patient's questions and concerns addressed to patient's satisfaction.\par

## 2020-06-04 NOTE — PHYSICAL EXAM
[Alert] : alert [Oriented to Place] : oriented to place [Oriented to Person] : oriented to person [Calm] : calm [Oriented to Time] : oriented to time [de-identified] : right breast   surgical wounds are  healing well. No signs of inflammation, infection or exudate. NEREYDA drains x2  with 10 ml/24h  of serous fluid. [de-identified] : She  is alert, well-groomed, and cheerful.\par

## 2020-06-04 NOTE — HISTORY OF PRESENT ILLNESS
[de-identified] : Ms. JAIMES  is s/p Right modified radical mastectomy and sentinel node biopsy on 05/20/2020. 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 Axillary lymph nodes (8) without metastatic tumor.  Today Ms. JAIMES offers no complaints. patient reports no fever, chills,  or  pain. Her  surgical wounds are  healing well. No signs of inflammation, infection or exudate. NEREYDA drains x2  with 10 ml/24h  of serous fluid. Patient reports good bowel movements and appetite.

## 2020-06-04 NOTE — DATA REVIEWED
[FreeTextEntry1] : KRUNAL JAIMES                          5\par \par \par \par Surgical Final Report\par \par \par \par \par Final Diagnosis\par \par 1 and 2. Right axillary sentinel lymph nodes; excision:\par Lymph nodes with no evidence of metastatic tumor; no isolated\par epithelial cells are identified, pN0(i-)\par \par 3. 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, pT1b), well-differentiated,\par Sheridan grade 1/3, score 4/9\par -nipple, overlying skin and margins of resection are\par uninvolved\par -lymphovascular permeation is not identified\par - Fibrocystic changes with usual ductal hyperplasia, adenosis,\par columnar cell change and apocrine metaplasia\par - Intraductal papilloma\par - Previous biopsy sites (two) identified\par - Axillary lymph nodes (8) without metastatic tumor\par See synoptic report\par \par Verified by: Yuli Espinosa M.D.\par (Electronic Signature)\par Reported on: 05/29/20 12:32 EDT, [facility],\par [address] [facility], [place] [address]\par Phone: [phone]   Fax: [phone]\par _________________________________________________________________\par \par Intraoperative Consultation\par Right Axillary [place] Lymph Node biopsy #1 and #2 (TP/FS):\par - Both lymph nodes are negaive.\par Reported to Dr. Mason by Dr. FRAN Nur on 5/20/20 at 10:50 am\par

## 2020-06-04 NOTE — PLAN
[FreeTextEntry1] : patient will follow up in one month or if needed. Warning signs, follow up, and restrictions were discussed with the patient. \par oncology consult- Dr Ferny Lionowitz

## 2020-07-06 ENCOUNTER — APPOINTMENT (OUTPATIENT)
Dept: SURGERY | Facility: CLINIC | Age: 75
End: 2020-07-06
Payer: MEDICARE

## 2020-07-13 ENCOUNTER — APPOINTMENT (OUTPATIENT)
Dept: SURGERY | Facility: CLINIC | Age: 75
End: 2020-07-13
Payer: MEDICARE

## 2020-07-13 VITALS — SYSTOLIC BLOOD PRESSURE: 120 MMHG | OXYGEN SATURATION: 98 % | DIASTOLIC BLOOD PRESSURE: 71 MMHG | HEART RATE: 56 BPM

## 2020-07-13 DIAGNOSIS — C50.911 MALIGNANT NEOPLASM OF UNSPECIFIED SITE OF RIGHT FEMALE BREAST: ICD-10-CM

## 2020-07-13 PROCEDURE — 99024 POSTOP FOLLOW-UP VISIT: CPT

## 2020-07-13 NOTE — HISTORY OF PRESENT ILLNESS
[de-identified] : Ms. JAIMES  is s/p Right modified radical mastectomy and sentinel node biopsy on 05/20/2020. 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 Axillary lymph nodes (8) without metastatic tumor.    Today  Ms. JAIMES offers no complaints. patient reports no fever, chills,  or  pain.  Her surgical wound  healed well. No signs of  complications.  she did not see Dr Ferny Bauer yet. \par

## 2020-07-13 NOTE — PHYSICAL EXAM
[Alert] : alert [Oriented to Person] : oriented to person [Oriented to Time] : oriented to time [Oriented to Place] : oriented to place [Calm] : calm [de-identified] : She  is alert, well-groomed, and cheerful.\par   [de-identified] :   anicteric.  Nasal mucosa pink, septum midline. Oral mucosa pink.  Tongue midline, Pharynx without exudates.\par   [de-identified] : right breast   surgical wounds healed  well. No signs of inflammation, infection or exudate. NEREYDA drains x2  with 10 ml/24h  of serous fluid. [de-identified] :  Neck supple. Trachea midline. Thyroid isthmus barely palpable, lobes not felt.\par

## 2020-07-13 NOTE — ASSESSMENT
[FreeTextEntry1] : Ms. JAIMES is doing well, with excellent post-operative recovery. The surgical incision is healing well and as expected. There is no evidence of infection or complication, and patient is progressing as expected. Post-operative wound care, activity, restrictions and precautions reinforced.  Pathology results were discussed in details again and she was advised to see Dr Ferny Bauer.  Patient's questions and concerns addressed to patient's satisfaction.\par

## 2020-07-13 NOTE — PLAN
[FreeTextEntry1] : patient will follow up in 4 months or if needed. Warning signs, follow up, and restrictions were discussed with the patient. \par consult Ferny Bauer